# Patient Record
Sex: MALE | Race: WHITE | NOT HISPANIC OR LATINO | URBAN - METROPOLITAN AREA
[De-identification: names, ages, dates, MRNs, and addresses within clinical notes are randomized per-mention and may not be internally consistent; named-entity substitution may affect disease eponyms.]

---

## 2018-02-23 ENCOUNTER — IMPORTED ENCOUNTER (OUTPATIENT)
Dept: URBAN - METROPOLITAN AREA CLINIC 31 | Facility: CLINIC | Age: 58
End: 2018-02-23

## 2018-02-23 PROBLEM — H04.123: Noted: 2018-02-23

## 2018-02-23 PROCEDURE — 99213 OFFICE O/P EST LOW 20 MIN: CPT

## 2018-02-23 NOTE — PATIENT DISCUSSION
1.  Dry Eye OU:  Continue current management with Artificial Tears at least BID and can use Lotemax. 2.  Return for an appointment in 6 months for comprehensive exam. with Dr. Reji Jon.

## 2018-09-07 ENCOUNTER — IMPORTED ENCOUNTER (OUTPATIENT)
Dept: URBAN - METROPOLITAN AREA CLINIC 31 | Facility: CLINIC | Age: 58
End: 2018-09-07

## 2018-09-07 PROBLEM — H50.05: Noted: 2018-09-07

## 2018-09-07 PROBLEM — H04.123: Noted: 2018-09-07

## 2018-09-07 PROBLEM — E05.00: Noted: 2018-09-07

## 2018-09-07 PROBLEM — H53.2: Noted: 2018-09-07

## 2018-09-07 PROBLEM — H50.21: Noted: 2018-09-07

## 2018-09-07 PROCEDURE — 92014 COMPRE OPH EXAM EST PT 1/>: CPT

## 2018-09-07 PROCEDURE — 92060 SENSORIMOTOR EXAMINATION: CPT

## 2018-09-07 NOTE — PATIENT DISCUSSION
Graves Eye Disease - with secondary eso and right hyper deviation. Try Fresnel OS 3 SUNI. Recheck in 2 weeks to see if stable. May need some vertical prism too. If stable can ground into glasses.

## 2018-09-07 NOTE — PATIENT DISCUSSION
1.  Dry Eye OU:  Continue current management with Artificial Tears. 2.  Graves Eye Disease - with secondary eso and right hyper deviation. Try Fresnel OD 4 SUNI. Recheck in 2 weeks to see if stable. May need some vertical prism too. If stable can ground into glasses. 3. Return for an appointment in 2 weeks for office call. with Dr. Se Sweeney. 4.  Diplopia - Fresnel OD 4 SUNI.

## 2018-10-04 ENCOUNTER — IMPORTED ENCOUNTER (OUTPATIENT)
Dept: URBAN - METROPOLITAN AREA CLINIC 31 | Facility: CLINIC | Age: 58
End: 2018-10-04

## 2018-10-04 PROBLEM — H04.123: Noted: 2018-10-04

## 2018-10-04 PROBLEM — H53.2: Noted: 2018-10-04

## 2018-10-04 PROCEDURE — 99213 OFFICE O/P EST LOW 20 MIN: CPT

## 2018-10-04 PROCEDURE — 92060 SENSORIMOTOR EXAMINATION: CPT

## 2018-10-04 NOTE — PATIENT DISCUSSION
1.  Diplopia secondary to Grave's Disease- 3.5 SUNI prism OU with 1 BD OD and 1 BU OS. 2. Return for an appointment in 4 months for office call. with Dr. Janay Yi

## 2018-10-04 NOTE — PATIENT DISCUSSION
1.  Dry Eye OU:  Continue current management with Artificial Tears. 2.  Diplopia secondary to Grave's Disease- 3.5 SUNI prism OU with 1 BD OD and 1 BU OS. 3. Return for an appointment in 4 months for office call. with Dr. Bree Carvajal.

## 2019-02-01 ENCOUNTER — IMPORTED ENCOUNTER (OUTPATIENT)
Dept: URBAN - METROPOLITAN AREA CLINIC 31 | Facility: CLINIC | Age: 59
End: 2019-02-01

## 2019-02-01 PROBLEM — E05.00: Noted: 2019-02-01

## 2019-02-01 PROBLEM — H53.2: Noted: 2019-02-01

## 2019-02-01 PROBLEM — H04.123: Noted: 2019-02-01

## 2019-02-01 PROCEDURE — 99213 OFFICE O/P EST LOW 20 MIN: CPT

## 2019-02-01 NOTE — PATIENT DISCUSSION
Graves Eye Disease - Mild . No diplopia - has glasses with prisms. NO optic neuropathy glaucoma. Mild CAROLEE.

## 2019-02-01 NOTE — PATIENT DISCUSSION
1.  Dry Eye OU:  Continue current management with Artificial Tears. 2.  Diplopia secondary to Grave's Disease. Discussed disease and possible ophthalmic complications and importance of monitoring. 3.  Keep F/U for an appointment in 6 months for office call. with Dr. Nehemias Hassan.

## 2019-07-30 ENCOUNTER — IMPORTED ENCOUNTER (OUTPATIENT)
Dept: URBAN - METROPOLITAN AREA CLINIC 31 | Facility: CLINIC | Age: 59
End: 2019-07-30

## 2019-07-30 PROBLEM — E05.00: Noted: 2019-07-30

## 2019-07-30 PROBLEM — H40.051: Noted: 2019-07-30

## 2019-07-30 PROCEDURE — 99214 OFFICE O/P EST MOD 30 MIN: CPT

## 2019-07-30 NOTE — PATIENT DISCUSSION
1.  Ocular HTN OD:  Elevated intraocular pressure without signs of glaucomatous damage to the optic nerve. Start Travatan Z HS OD. Sample given. 2. Graves Eye Disease - Mild . No diplopia with glasses with prisms. Elevated IOP OD. DC Lotemax. Consult Dr. Beena Iniguez. 3.  Return for an appointment in 1 week for office call. with Dr. Padmini Gibbs.

## 2019-07-30 NOTE — PATIENT DISCUSSION
Graves Eye Disease - Mild . No diplopia with glasses with prisms. Elevated IOP OD. DC Lotemax. Consult Dr. Tana Cain.

## 2019-08-09 ENCOUNTER — IMPORTED ENCOUNTER (OUTPATIENT)
Dept: URBAN - METROPOLITAN AREA CLINIC 31 | Facility: CLINIC | Age: 59
End: 2019-08-09

## 2019-08-09 PROBLEM — E05.00: Noted: 2019-08-09

## 2019-08-09 PROBLEM — H40.051: Noted: 2019-08-09

## 2019-08-09 PROCEDURE — 99213 OFFICE O/P EST LOW 20 MIN: CPT

## 2019-08-09 NOTE — PATIENT DISCUSSION
1.  Ocular HTN OD:  Good response to Travatan Z with good IOP today. T max 32 mmHg. Continue Travatan Z HS OD. 2.  Graves Eye Disease - Mild . No diplopia with glasses with prisms. Hold on Lotemax. Keep appt with Dr. Elisabeth Montoya. 3.  Return for an appointment in 2 months for pressure check with Dr. Se Sweeney.

## 2021-01-06 ENCOUNTER — IMPORTED ENCOUNTER (OUTPATIENT)
Dept: URBAN - METROPOLITAN AREA CLINIC 31 | Facility: CLINIC | Age: 61
End: 2021-01-06

## 2021-01-06 PROBLEM — E05.00: Noted: 2021-01-06

## 2021-01-06 PROBLEM — H53.2: Noted: 2021-01-06

## 2021-01-06 PROBLEM — H40.051: Noted: 2021-01-06

## 2021-01-06 PROCEDURE — 92015 DETERMINE REFRACTIVE STATE: CPT

## 2021-01-06 PROCEDURE — 92060 SENSORIMOTOR EXAMINATION: CPT

## 2021-01-06 PROCEDURE — 92014 COMPRE OPH EXAM EST PT 1/>: CPT

## 2021-01-06 NOTE — PATIENT DISCUSSION
1.  Diplopia - significant improvement sp Tepezza treatment. Patient able to now fuse without prism. Remnant eso deviation and no remaining vertical deviation. 2.  Ocular HTN OD: Acceptable IOP today. T max 32 mmHg. Continue Travatan Z HS OD.  3.  Graves Eye Disease - Improvement in proptosis and EOM dysfunction. Follow up with Dr. Ramon Perez. 4.  Hold on getting new glasses. Do not use newer glasses which have prism. Use old glasses with no prism until next check. 5. Return for an appointment in 2 months for office call with Dr. Dyan Olea.

## 2021-01-06 NOTE — PATIENT DISCUSSION
1.  Ocular HTN OD:  Good response to Travatan Z with good IOP today. T max 32 mmHg. Continue Travatan Z HS OD. 2.  Graves Eye Disease - Improvement in proptosis and EOM dysfunction. Follow up with Dr. Elisabeth Montoya. 3.  Return for an appointment in 2 months for office call with Dr. Se Sweeney.

## 2021-04-16 ENCOUNTER — IMPORTED ENCOUNTER (OUTPATIENT)
Dept: URBAN - METROPOLITAN AREA CLINIC 31 | Facility: CLINIC | Age: 61
End: 2021-04-16

## 2021-04-16 PROBLEM — E05.00: Noted: 2021-04-16

## 2021-04-16 PROBLEM — H53.2: Noted: 2021-04-16

## 2021-04-16 PROBLEM — H40.051: Noted: 2021-04-16

## 2021-04-16 PROCEDURE — 92015 DETERMINE REFRACTIVE STATE: CPT

## 2021-04-16 PROCEDURE — 99214 OFFICE O/P EST MOD 30 MIN: CPT

## 2021-04-16 PROCEDURE — 92060 SENSORIMOTOR EXAMINATION: CPT

## 2021-04-16 NOTE — PATIENT DISCUSSION
1.  Diplopia - improvement sp Tepezza treatment but some persistent and variable deviation. Patient able to now fuse without prism but with difficulty. needs new rx with prism. 2. Ocular HTN OD: Acceptable IOP today. T max 32 mmHg. Continue Travatan Z HS OD.  3.  Graves Eye Disease - Follow up with Dr. Elisabeth Montoya. 4.  Refractive error - update glasses with 4 SUNI OU 1 BD OD and 1 BU OS. Understands may change. 5. Return for an appointment in 4 months for office call with Dr. Se Sweeney.

## 2021-09-17 ENCOUNTER — IMPORTED ENCOUNTER (OUTPATIENT)
Dept: URBAN - METROPOLITAN AREA CLINIC 31 | Facility: CLINIC | Age: 61
End: 2021-09-17

## 2021-09-17 NOTE — PATIENT DISCUSSION
1.  Diplopia - improvement sp Tepezza treatment but some persistent and variable deviation. Patient able to now fuse without prism but with difficulty. Update rx with adjusted prism. 2. Ocular HTN OD: Acceptable IOP today. T max 32 mmHg. Continue Travatan Z HS OD.  3.  Graves Eye Disease - Follow up with Dr. George Herrera. 4.  Refractive error - update glasses. Understands may change. 5. Return for an appointment in 4 months for office call with Dr. Parris Leone.

## 2021-09-21 PROBLEM — H53.2: Noted: 2021-09-21

## 2021-09-21 PROBLEM — H40.051: Noted: 2021-09-21

## 2021-09-21 PROBLEM — E05.00: Noted: 2021-09-21

## 2022-03-18 ENCOUNTER — IMPORTED ENCOUNTER (OUTPATIENT)
Dept: URBAN - METROPOLITAN AREA CLINIC 31 | Facility: CLINIC | Age: 62
End: 2022-03-18

## 2022-03-18 PROBLEM — E05.00: Noted: 2022-03-18

## 2022-03-18 PROBLEM — H40.051: Noted: 2022-03-18

## 2022-03-18 PROBLEM — H53.2: Noted: 2022-03-18

## 2022-03-18 PROCEDURE — 99214 OFFICE O/P EST MOD 30 MIN: CPT

## 2022-03-18 PROCEDURE — 92060 SENSORIMOTOR EXAMINATION: CPT

## 2022-03-18 PROCEDURE — 92015 DETERMINE REFRACTIVE STATE: CPT

## 2022-03-18 NOTE — PATIENT DISCUSSION
1.  Ocular HTN OD:  Good response to Travatan Z with good IOP today. T max 32 mmHg. Continue Travatan Z HS OD. 2.  Graves Disease with associated ALL - Increased vertical diplopia. Increase prism. Keep appt with Dr. Stanley Wakefield. 3.  Return for an appointment in 6 months for office call and pressure check with Dr. Argelia Schaeffer.

## 2022-03-18 NOTE — PATIENT DISCUSSION
1.  Diplopia - New rx with increase ion prism (2 1/2 OD and 3 OS) 2. Refractive error - change glasses. 3. Ocular HTN OD:  Good response to Travatan Z with good IOP today. T max 32 mmHg. Continue Travatan Z HS OD. 4.  Graves Disease with associated ALL - Increased vertical diplopia. Increase prism. Keep appt with Dr. Elisabeth Montoya. 5.  Return for an appointment in 6 months for office call and pressure check with Dr. Se Sweeney.

## 2022-04-01 ASSESSMENT — VISUAL ACUITY
OS_CC: J1
OU_CC: J1+
OS_SC: 20/20-1
OS_SC: 20/20
OD_CC: 20/40-3
OU_SC: J1+
OD_SC: 20/25-2
OD_SC: 20/40
OD_CC: J1+
OS_CC: 20/150
OS_SC: 20/30
OD_SC: 20/20
OD_SC: 20/70
OU_CC: 20/60
OD_SC: 20/20
OS_SC: 20/20
OD_SC: 20/20
OD_SC: 20/30
OS_SC: 20/40+2
OS_SC: 20/20
OS_SC: 20/40

## 2022-04-01 ASSESSMENT — TONOMETRY
OD_IOP_MMHG: 19
OD_IOP_MMHG: 17
OD_IOP_MMHG: 17
OS_IOP_MMHG: 18
OS_IOP_MMHG: 16
OD_IOP_MMHG: 30
OS_IOP_MMHG: 16
OD_IOP_MMHG: 20
OS_IOP_MMHG: 18
OD_IOP_MMHG: 18
OD_IOP_MMHG: 32
OD_IOP_MMHG: 17
OS_IOP_MMHG: 16
OS_IOP_MMHG: 17
OS_IOP_MMHG: 17
OD_IOP_MMHG: 20
OS_IOP_MMHG: 19

## 2022-06-01 ENCOUNTER — FOLLOW UP (OUTPATIENT)
Dept: URBAN - METROPOLITAN AREA CLINIC 29 | Facility: CLINIC | Age: 62
End: 2022-06-01

## 2022-06-01 DIAGNOSIS — H53.2: ICD-10-CM

## 2022-06-01 DIAGNOSIS — E05.00: ICD-10-CM

## 2022-06-01 DIAGNOSIS — H40.051: ICD-10-CM

## 2022-06-01 PROCEDURE — 99213 OFFICE O/P EST LOW 20 MIN: CPT

## 2022-06-01 PROCEDURE — 92060 SENSORIMOTOR EXAMINATION: CPT

## 2022-06-01 ASSESSMENT — VISUAL ACUITY
OS_CC: 20/20-1
OD_CC: 20/20-1
OS_CC: 20/20
OD_CC: 20/20

## 2022-06-01 ASSESSMENT — TONOMETRY
OS_IOP_MMHG: 18
OD_IOP_MMHG: 18

## 2022-06-01 NOTE — PATIENT DISCUSSION
Deviation increase has resolved and now back to previous deviation. Stay with 4.5 SUNI OU and 1.5 BD OD and 1.5 BU OS. Monitor.

## 2022-07-09 ENCOUNTER — TELEPHONE ENCOUNTER (OUTPATIENT)
Dept: URBAN - METROPOLITAN AREA CLINIC 121 | Facility: CLINIC | Age: 62
End: 2022-07-09

## 2022-07-09 RX ORDER — OMEPRAZOLE 20 MG/1
CAPSULE, DELAYED RELEASE ORAL
Refills: 0 | OUTPATIENT
Start: 2014-05-23 | End: 2014-05-23

## 2022-07-09 RX ORDER — ATENOLOL 25 MG/1
TABLET ORAL
Refills: 0 | OUTPATIENT
Start: 2014-05-23 | End: 2016-01-29

## 2022-07-10 ENCOUNTER — TELEPHONE ENCOUNTER (OUTPATIENT)
Dept: URBAN - METROPOLITAN AREA CLINIC 121 | Facility: CLINIC | Age: 62
End: 2022-07-10

## 2022-07-10 RX ORDER — ATENOLOL 25 MG/1
TABLET ORAL
Refills: 0 | Status: ACTIVE | COMMUNITY
Start: 2016-01-29

## 2022-07-10 RX ORDER — OMEPRAZOLE 20 MG/1
TABLET, DELAYED RELEASE ORAL
Refills: 0 | Status: ACTIVE | COMMUNITY
Start: 2016-01-29

## 2022-07-10 RX ORDER — POLYETHYLENE GLYCOL 3350, SODIUM SULFATE, SODIUM CHLORIDE, POTASSIUM CHLORIDE, ASCORBIC ACID, SODIUM ASCORBATE 7.5-2.691G
KIT ORAL TAKE AS DIRECTED
Refills: 0 | Status: ACTIVE | COMMUNITY
Start: 2014-05-23

## 2022-07-10 RX ORDER — ESOMEPRAZOLE MAGNESIUM 40 MG
CAPSULE,DELAYED RELEASE (ENTERIC COATED) ORAL ONCE A DAY
Refills: 3 | Status: ACTIVE | COMMUNITY
Start: 2008-12-19

## 2023-09-01 ENCOUNTER — LAB OUTSIDE AN ENCOUNTER (OUTPATIENT)
Dept: URBAN - METROPOLITAN AREA CLINIC 63 | Facility: CLINIC | Age: 63
End: 2023-09-01

## 2023-09-01 ENCOUNTER — WEB ENCOUNTER (OUTPATIENT)
Dept: URBAN - METROPOLITAN AREA CLINIC 63 | Facility: CLINIC | Age: 63
End: 2023-09-01

## 2023-09-01 ENCOUNTER — OFFICE VISIT (OUTPATIENT)
Dept: URBAN - METROPOLITAN AREA CLINIC 63 | Facility: CLINIC | Age: 63
End: 2023-09-01
Payer: COMMERCIAL

## 2023-09-01 VITALS
TEMPERATURE: 97.6 F | OXYGEN SATURATION: 97 % | HEIGHT: 70 IN | WEIGHT: 204.2 LBS | DIASTOLIC BLOOD PRESSURE: 80 MMHG | HEART RATE: 71 BPM | SYSTOLIC BLOOD PRESSURE: 126 MMHG | BODY MASS INDEX: 29.23 KG/M2

## 2023-09-01 DIAGNOSIS — R61 NIGHT SWEATS: ICD-10-CM

## 2023-09-01 DIAGNOSIS — R10.13 EPIGASTRIC PAIN: ICD-10-CM

## 2023-09-01 DIAGNOSIS — Z86.010 PERSONAL HISTORY OF COLONIC POLYPS: ICD-10-CM

## 2023-09-01 DIAGNOSIS — R11.0 NAUSEA: ICD-10-CM

## 2023-09-01 DIAGNOSIS — R74.8 ELEVATED LIVER ENZYMES: ICD-10-CM

## 2023-09-01 DIAGNOSIS — K21.9 GASTROESOPHAGEAL REFLUX DISEASE, UNSPECIFIED WHETHER ESOPHAGITIS PRESENT: ICD-10-CM

## 2023-09-01 DIAGNOSIS — K57.90 DIVERTICULOSIS: ICD-10-CM

## 2023-09-01 DIAGNOSIS — K76.0 FATTY LIVER: ICD-10-CM

## 2023-09-01 DIAGNOSIS — R63.4 UNINTENTIONAL WEIGHT LOSS: ICD-10-CM

## 2023-09-01 DIAGNOSIS — R14.0 BLOATING SYMPTOM: ICD-10-CM

## 2023-09-01 PROBLEM — 422587007: Status: ACTIVE | Noted: 2023-09-01

## 2023-09-01 PROBLEM — 197321007: Status: ACTIVE | Noted: 2023-09-01

## 2023-09-01 PROBLEM — 448765001: Status: ACTIVE | Noted: 2023-09-01

## 2023-09-01 PROBLEM — 42984000: Status: ACTIVE | Noted: 2023-09-01

## 2023-09-01 PROBLEM — 397881000: Status: ACTIVE | Noted: 2023-09-01

## 2023-09-01 PROBLEM — 248490000: Status: ACTIVE | Noted: 2023-09-01

## 2023-09-01 PROBLEM — 428283002: Status: ACTIVE | Noted: 2023-09-01

## 2023-09-01 PROBLEM — 235595009: Status: ACTIVE | Noted: 2023-09-01

## 2023-09-01 PROCEDURE — 99204 OFFICE O/P NEW MOD 45 MIN: CPT | Performed by: PHYSICIAN ASSISTANT

## 2023-09-01 RX ORDER — LEVOTHYROXINE SODIUM 88 UG/1
TABLET ORAL
Qty: 90 TABLET | Status: ACTIVE | COMMUNITY

## 2023-09-01 RX ORDER — OMEPRAZOLE 20 MG/1
CAPSULE, DELAYED RELEASE ORAL
Qty: 90 CAPSULE | Status: ACTIVE | COMMUNITY

## 2023-09-01 RX ORDER — ATENOLOL 25 MG/1
TAKE 1 TABLET BY MOUTH EVERY DAY TABLET ORAL
Qty: 90 EACH | Refills: 1 | Status: ACTIVE | COMMUNITY

## 2023-09-01 RX ORDER — SODIUM, POTASSIUM,MAG SULFATES 17.5-3.13G
177ML SOLUTION, RECONSTITUTED, ORAL ORAL
Qty: 1 KIT | Refills: 0 | OUTPATIENT
Start: 2023-09-01 | End: 2023-09-02

## 2023-09-01 RX ORDER — GABAPENTIN 300 MG/1
TAKE 1 CAPSULE BY MOUTH THREE TIMES A DAY AS DIRECTED FOR 30 DAYS CAPSULE ORAL
Qty: 90 EACH | Refills: 0 | Status: ACTIVE | COMMUNITY

## 2023-09-01 RX ORDER — EZETIMIBE 10 MG/1
TAKE 1 TABLET BY MOUTH EVERY DAY TABLET ORAL
Qty: 90 EACH | Refills: 1 | Status: DISCONTINUED | COMMUNITY

## 2023-09-01 RX ORDER — METFORMIN HYDROCHLORIDE 500 MG/1
TAKE 2 TABLETS BY MOUTH IN THE MORNING AND 2 TABLETS WITH DINNER DAILY TABLET, FILM COATED ORAL
Qty: 360 EACH | Refills: 1 | Status: ACTIVE | COMMUNITY

## 2023-09-01 RX ORDER — OMEPRAZOLE 20 MG/1
1 CAPSULE 30 MINUTES BEFORE MORNING MEAL AND EVENING MEAL CAPSULE, DELAYED RELEASE ORAL TWICE A DAY
Qty: 180 | Refills: 0

## 2023-09-01 RX ORDER — ONDANSETRON HYDROCHLORIDE 4 MG/1
1 TABLET TABLET, FILM COATED ORAL
Qty: 30 | Refills: 0 | OUTPATIENT
Start: 2023-09-01

## 2023-09-01 RX ORDER — HYDROXYZINE HYDROCHLORIDE 25 MG/1
1 TABLET AS NEEDED TABLET, FILM COATED ORAL ONCE A DAY
Status: ACTIVE | COMMUNITY

## 2023-09-01 RX ORDER — EMPAGLIFLOZIN 25 MG/1
TAKE 1 TABLET BY MOUTH EVERY DAY TABLET, FILM COATED ORAL
Qty: 90 EACH | Refills: 0 | Status: ACTIVE | COMMUNITY

## 2023-09-01 RX ORDER — METHOCARBAMOL 500 MG/1
TABLET, FILM COATED ORAL
Qty: 240 TABLET | Status: ACTIVE | COMMUNITY

## 2023-09-01 RX ORDER — HYDROCODONE BITARTRATE AND ACETAMINOPHEN 5; 325 MG/1; MG/1
1 TABLET AS NEEDED TABLET ORAL
Status: ACTIVE | COMMUNITY

## 2023-09-01 NOTE — HPI-TODAY'S VISIT:
Patient is a 63 year old male with complaints of  increased bloating, nausea, distention , night sweats, which started a few months ago. In the meantime he is also having worsening GERD symptoms not controlled anymore by omeprazole. He has eliminated his trigger foods and feels that his GERD symptoms are not related to diet. He c/o epigastric pain that wakes him from sleep with associated nausea w/o vomiting. He c/o lower abdominal pain for 3-4 months. Had CT abdomen which was normal without change from a year ago.  CT showed Fatty liver. Lost 15 pounds unintentionally in the past 6 weeks. He has reduced appetite. He c/o  Early satiety. Bms are normal, 2-3 per day , stool is formed, no diarrhea or constipation. Having bright red blood when he wipes, couple times a week. Having rectal pain after 2nd or 3rd bowel movement.  Recent labs 8/4/2023 at [facility]  glucose 87 Creatinine 0.76 Sodium 139 Potassium 4.3   TSH 6.83 Alkaline phosphatase 98 Bilirubin 0.5 CT abdomen and pelvis with and without contrast 8/11/2023 No ileus or enterocolitis is shown. Occasional diverticuli are present. Pancreatic 6 mm lesion is stable over multiple years on multiple exams consistent with IPMN. Fatty change in the liver is redemonstrated.  Low-density liver parenchyma may reflect hepatocellular disease such as steatosis. Impression stable subcentimeter nodule along the right major lung fissure, probably a benign intrafissural lymph node.  No suspicious pulmonary finding.  Scattered mild scarring and again noted. Abdominal ultrasound 7/29/2020 Fatty infiltration of the liver  COLON 7/24/14 IH, no polyps.  EGD w/ dilation 10/9/2008.

## 2023-09-07 ENCOUNTER — COMPREHENSIVE EXAM (OUTPATIENT)
Dept: URBAN - METROPOLITAN AREA CLINIC 29 | Facility: CLINIC | Age: 63
End: 2023-09-07

## 2023-09-07 DIAGNOSIS — E11.9: ICD-10-CM

## 2023-09-07 DIAGNOSIS — E05.00: ICD-10-CM

## 2023-09-07 DIAGNOSIS — H53.2: ICD-10-CM

## 2023-09-07 DIAGNOSIS — H40.051: ICD-10-CM

## 2023-09-07 PROCEDURE — 99214 OFFICE O/P EST MOD 30 MIN: CPT

## 2023-09-07 PROCEDURE — 92015 DETERMINE REFRACTIVE STATE: CPT

## 2023-09-07 ASSESSMENT — VISUAL ACUITY
OD_PH: 20/25
OD_CC: 20/20-1
OD_CC: 20/40
OS_CC: 20/25-1
OS_CC: 20/20-1

## 2023-09-07 ASSESSMENT — TONOMETRY
OD_IOP_MMHG: 18
OS_IOP_MMHG: 16

## 2023-09-08 ENCOUNTER — LAB OUTSIDE AN ENCOUNTER (OUTPATIENT)
Dept: URBAN - METROPOLITAN AREA CLINIC 63 | Facility: CLINIC | Age: 63
End: 2023-09-08

## 2023-09-08 PROBLEM — 707724006: Status: ACTIVE | Noted: 2023-09-08

## 2023-09-19 ENCOUNTER — TELEPHONE ENCOUNTER (OUTPATIENT)
Dept: URBAN - METROPOLITAN AREA CLINIC 63 | Facility: CLINIC | Age: 63
End: 2023-09-19

## 2023-09-21 ENCOUNTER — WEB ENCOUNTER (OUTPATIENT)
Dept: URBAN - METROPOLITAN AREA CLINIC 63 | Facility: CLINIC | Age: 63
End: 2023-09-21

## 2023-09-21 LAB
ABSOLUTE BASOPHILS: 66
ABSOLUTE EOSINOPHILS: 432
ABSOLUTE LYMPHOCYTES: 4690
ABSOLUTE MONOCYTES: 1323
ABSOLUTE NEUTROPHILS: 6589
BASOPHILS: 0.5
EOSINOPHILS: 3.3
HEMATOCRIT: 46.9
HEMOGLOBIN: 15.3
LYMPHOCYTES: 35.8
MCH: 29.6
MCHC: 32.6
MCV: 90.7
MONOCYTES: 10.1
MPV: 10.8
NEUTROPHILS: 50.3
PLATELET COUNT: 316
RDW: 16.2
RED BLOOD CELL COUNT: 5.17
WHITE BLOOD CELL COUNT: 13.1

## 2023-09-23 LAB
IMMUNOGLOBULIN A: 162
IMMUNOGLOBULIN G: 1051
IMMUNOGLOBULIN M: 169

## 2023-10-04 LAB
ACTIN (SMOOTH MUSCLE) ANTIBODY (IGG): <20
AFP, SERUM, TUMOR MARKER: 5.2
ALPHA-1-ANTITRYPSIN (AAT) PHENOTYPE: (no result)
ANA SCREEN, IFA: NEGATIVE
FERRITIN, SERUM: 31
HCV RNA, QUANTITATIVE REAL TIME PCR: <1.18
HCV RNA, QUANTITATIVE REAL TIME PCR: <15
HEP A AB, IGM: (no result)
HEP B CORE AB, IGM: (no result)
HEPATITIS A AB, TOTAL: (no result)
HEPATITIS B CORE AB TOTAL: (no result)
HEPATITIS B SURFACE AB IMMUNITY, QN: <5
HEPATITIS B SURFACE ANTIGEN: (no result)
HEPATITIS C ANTIBODY: (no result)
HEREDITARY HEMOCHROMATOSIS DNA MUT: (no result)
INR: 1.1
IRON BIND.CAP.(TIBC): 517
IRON SATURATION: 17
IRON: 88
MITOCHONDRIAL (M2) ANTIBODY: <=20
PT: 11

## 2023-10-10 ENCOUNTER — OFFICE VISIT (OUTPATIENT)
Dept: URBAN - METROPOLITAN AREA SURGERY CENTER 4 | Facility: SURGERY CENTER | Age: 63
End: 2023-10-10
Payer: COMMERCIAL

## 2023-10-10 ENCOUNTER — CLAIMS CREATED FROM THE CLAIM WINDOW (OUTPATIENT)
Dept: URBAN - METROPOLITAN AREA CLINIC 4 | Facility: CLINIC | Age: 63
End: 2023-10-10
Payer: COMMERCIAL

## 2023-10-10 DIAGNOSIS — K44.9 DIAPHRAGMATIC HERNIA WITHOUT OBSTRUCTION OR GANGRENE: ICD-10-CM

## 2023-10-10 DIAGNOSIS — K64.0 FIRST DEGREE HEMORRHOIDS: ICD-10-CM

## 2023-10-10 DIAGNOSIS — K29.70 GASTRITIS WITHOUT BLEEDING, UNSPECIFIED CHRONICITY, UNSPECIFIED GASTRITIS TYPE: ICD-10-CM

## 2023-10-10 DIAGNOSIS — Z12.11 ENCOUNTER FOR SCREENING FOR MALIGNANT NEOPLASM OF COLON: ICD-10-CM

## 2023-10-10 DIAGNOSIS — Z87.19 PERSONAL HISTORY OF OTHER DISEASES OF THE DIGESTIVE SYSTEM: ICD-10-CM

## 2023-10-10 DIAGNOSIS — K22.2 ESOPHAGEAL OBSTRUCTION: ICD-10-CM

## 2023-10-10 DIAGNOSIS — K63.5 POLYP OF ASCENDING COLON, UNSPECIFIED TYPE: ICD-10-CM

## 2023-10-10 DIAGNOSIS — K31.89 OTHER DISEASES OF STOMACH AND DUODENUM: ICD-10-CM

## 2023-10-10 DIAGNOSIS — K63.5 BENIGN COLONIC POLYP: ICD-10-CM

## 2023-10-10 DIAGNOSIS — R12 HEARTBURN: ICD-10-CM

## 2023-10-10 DIAGNOSIS — Z86.010 ADENOMAS PERSONAL HISTORY OF COLONIC POLYPS: ICD-10-CM

## 2023-10-10 DIAGNOSIS — K44.9 HIATAL HERNIA: ICD-10-CM

## 2023-10-10 PROCEDURE — 00813 ANES UPR LWR GI NDSC PX: CPT | Performed by: NURSE ANESTHETIST, CERTIFIED REGISTERED

## 2023-10-10 PROCEDURE — 88305 TISSUE EXAM BY PATHOLOGIST: CPT | Performed by: PATHOLOGY

## 2023-10-10 PROCEDURE — 45385 COLONOSCOPY W/LESION REMOVAL: CPT | Performed by: INTERNAL MEDICINE

## 2023-10-10 PROCEDURE — 88312 SPECIAL STAINS GROUP 1: CPT | Performed by: PATHOLOGY

## 2023-10-10 PROCEDURE — 43239 EGD BIOPSY SINGLE/MULTIPLE: CPT | Performed by: INTERNAL MEDICINE

## 2023-10-10 RX ORDER — OMEPRAZOLE 20 MG/1
1 CAPSULE 30 MINUTES BEFORE MORNING MEAL AND EVENING MEAL CAPSULE, DELAYED RELEASE ORAL TWICE A DAY
Qty: 180 | Refills: 0 | Status: ACTIVE | COMMUNITY

## 2023-10-10 RX ORDER — ONDANSETRON HYDROCHLORIDE 4 MG/1
1 TABLET TABLET, FILM COATED ORAL
Qty: 30 | Refills: 0 | Status: ACTIVE | COMMUNITY
Start: 2023-09-01

## 2023-10-10 RX ORDER — METHOCARBAMOL 500 MG/1
TABLET, FILM COATED ORAL
Qty: 240 TABLET | Status: ACTIVE | COMMUNITY

## 2023-10-10 RX ORDER — GABAPENTIN 300 MG/1
TAKE 1 CAPSULE BY MOUTH THREE TIMES A DAY AS DIRECTED FOR 30 DAYS CAPSULE ORAL
Qty: 90 EACH | Refills: 0 | Status: ACTIVE | COMMUNITY

## 2023-10-10 RX ORDER — HYDROXYZINE HYDROCHLORIDE 25 MG/1
1 TABLET AS NEEDED TABLET, FILM COATED ORAL ONCE A DAY
Status: ACTIVE | COMMUNITY

## 2023-10-10 RX ORDER — EMPAGLIFLOZIN 25 MG/1
TAKE 1 TABLET BY MOUTH EVERY DAY TABLET, FILM COATED ORAL
Qty: 90 EACH | Refills: 0 | Status: ACTIVE | COMMUNITY

## 2023-10-10 RX ORDER — METFORMIN HYDROCHLORIDE 500 MG/1
TAKE 2 TABLETS BY MOUTH IN THE MORNING AND 2 TABLETS WITH DINNER DAILY TABLET, FILM COATED ORAL
Qty: 360 EACH | Refills: 1 | Status: ACTIVE | COMMUNITY

## 2023-10-10 RX ORDER — LEVOTHYROXINE SODIUM 88 UG/1
TABLET ORAL
Qty: 90 TABLET | Status: ACTIVE | COMMUNITY

## 2023-10-10 RX ORDER — ATENOLOL 25 MG/1
TAKE 1 TABLET BY MOUTH EVERY DAY TABLET ORAL
Qty: 90 EACH | Refills: 1 | Status: ACTIVE | COMMUNITY

## 2023-10-10 RX ORDER — HYDROCODONE BITARTRATE AND ACETAMINOPHEN 5; 325 MG/1; MG/1
1 TABLET AS NEEDED TABLET ORAL
Status: ACTIVE | COMMUNITY

## 2023-10-11 ENCOUNTER — TELEPHONE ENCOUNTER (OUTPATIENT)
Dept: URBAN - METROPOLITAN AREA CLINIC 64 | Facility: CLINIC | Age: 63
End: 2023-10-11

## 2023-11-02 ENCOUNTER — OFFICE VISIT (OUTPATIENT)
Dept: URBAN - METROPOLITAN AREA CLINIC 63 | Facility: CLINIC | Age: 63
End: 2023-11-02

## 2023-11-10 ENCOUNTER — OFFICE VISIT (OUTPATIENT)
Dept: URBAN - METROPOLITAN AREA CLINIC 57 | Facility: CLINIC | Age: 63
End: 2023-11-10
Payer: COMMERCIAL

## 2023-11-10 ENCOUNTER — LAB OUTSIDE AN ENCOUNTER (OUTPATIENT)
Dept: URBAN - METROPOLITAN AREA CLINIC 57 | Facility: CLINIC | Age: 63
End: 2023-11-10

## 2023-11-10 VITALS
HEIGHT: 70 IN | WEIGHT: 206 LBS | BODY MASS INDEX: 29.49 KG/M2 | OXYGEN SATURATION: 98 % | DIASTOLIC BLOOD PRESSURE: 80 MMHG | SYSTOLIC BLOOD PRESSURE: 124 MMHG | HEART RATE: 78 BPM

## 2023-11-10 DIAGNOSIS — K21.9 GASTROESOPHAGEAL REFLUX DISEASE, UNSPECIFIED WHETHER ESOPHAGITIS PRESENT: ICD-10-CM

## 2023-11-10 DIAGNOSIS — Z86.010 HISTORY OF COLON POLYPS: ICD-10-CM

## 2023-11-10 DIAGNOSIS — K76.0 FATTY LIVER: ICD-10-CM

## 2023-11-10 DIAGNOSIS — R74.8 ELEVATED LIVER ENZYMES: ICD-10-CM

## 2023-11-10 PROBLEM — 19943007: Status: ACTIVE | Noted: 2023-11-10

## 2023-11-10 PROBLEM — 235623002: Status: ACTIVE | Noted: 2023-11-10

## 2023-11-10 PROBLEM — 8493009: Status: ACTIVE | Noted: 2023-11-10

## 2023-11-10 PROCEDURE — 99214 OFFICE O/P EST MOD 30 MIN: CPT

## 2023-11-10 RX ORDER — OMEPRAZOLE 20 MG/1
1 CAPSULE 30 MINUTES BEFORE MORNING MEAL AND EVENING MEAL CAPSULE, DELAYED RELEASE ORAL TWICE A DAY
Qty: 180 | Refills: 0 | Status: ACTIVE | COMMUNITY

## 2023-11-10 RX ORDER — METFORMIN HYDROCHLORIDE 500 MG/1
TAKE 2 TABLETS BY MOUTH IN THE MORNING AND 2 TABLETS WITH DINNER DAILY TABLET, FILM COATED ORAL
Qty: 360 EACH | Refills: 1 | Status: ACTIVE | COMMUNITY

## 2023-11-10 RX ORDER — HYDROCODONE BITARTRATE AND ACETAMINOPHEN 5; 325 MG/1; MG/1
1 TABLET AS NEEDED TABLET ORAL
Status: ACTIVE | COMMUNITY

## 2023-11-10 RX ORDER — GABAPENTIN 300 MG/1
TAKE 1 CAPSULE BY MOUTH THREE TIMES A DAY AS DIRECTED FOR 30 DAYS CAPSULE ORAL
Qty: 90 EACH | Refills: 0 | Status: ACTIVE | COMMUNITY

## 2023-11-10 RX ORDER — METHOCARBAMOL 500 MG/1
TABLET, FILM COATED ORAL
Qty: 240 TABLET | Status: ACTIVE | COMMUNITY

## 2023-11-10 RX ORDER — HYDROXYZINE HYDROCHLORIDE 25 MG/1
1 TABLET AS NEEDED TABLET, FILM COATED ORAL ONCE A DAY
Status: ACTIVE | COMMUNITY

## 2023-11-10 RX ORDER — EMPAGLIFLOZIN 25 MG/1
TAKE 1 TABLET BY MOUTH EVERY DAY TABLET, FILM COATED ORAL
Qty: 90 EACH | Refills: 0 | Status: ACTIVE | COMMUNITY

## 2023-11-10 RX ORDER — LEVOTHYROXINE SODIUM 88 UG/1
TABLET ORAL
Qty: 90 TABLET | Status: ACTIVE | COMMUNITY

## 2023-11-10 RX ORDER — ATENOLOL 25 MG/1
TAKE 1 TABLET BY MOUTH EVERY DAY TABLET ORAL
Qty: 90 EACH | Refills: 1 | Status: ACTIVE | COMMUNITY

## 2023-11-10 NOTE — HPI-PREVIOUS PROCEDURES
EGD 10/10/2023: - Normal esophagus.- 1 cm hiatal hernia.- Chronic gastritis.  Biopsied.- Normal duodenal bulb and second portion of the duodenum.- Non-obstructing Schatzki ring.  Biopsied.- Biopsies were taken with a cold forceps for evaluation of eosinophilic esophagitis . Colonoscopy 10/10/2023: - Non-bleeding internal hemorrhoids.- One 6 mm polyp in the ascending colon, removed with a cold snare.  Resected and retrieved - Follow up for colonoscopy in 5 years . Pathology returning as follows: (A) Stomach, Antrum and Body, Biopsy:FOVEOLAR HYPERPLASIA. See Comment.No Evidence of H. Pylori Organisms or Intestinal Metaplasia.Negative for Dysplasia or Malignancy.COMMENT: Foveolar hyperplasia is common in gastric mucosa adjacent to ulcer or as part of chemicalgastropathy (such as NSAID, Alcohol and Bile reflex), and rare in the gastric mucosa overlying a masslesion. . (B) Gastroesophageal Junction, Biopsy:SQUAMOCOLUMNAR MUCOSA WITH REFLUX-TYPE CHANGES.No Evidence of Deluna's Esophagus or Eosinophilic Esophagitis.Negative for Infectious organisms, Dysplasia or Malignancy. . (C) Esophagus, Upper-Third, Biopsy:SQUAMOUS MUCOSA WITH REFLUX-TYPE CHANGES; NO COLUMNAR MUCOSA             IDENTIFIED.No Evidence of Deluna's Esophagus or Eosinophilic Esophagitis.Negative for Infectious organisms, Dysplasia or Malignancy. . (D) Colon, Ascending, Polypectomy:TUBULAR ADENOMA

## 2023-11-10 NOTE — HPI-PREVIOUS LABS
Dscussed 9/19/2023 labs in detail. Including investigation of cause for fatty liver (labs showing no abnormalities suggesting cause of fatty liver)

## 2023-11-10 NOTE — HPI-TODAY'S VISIT:
Patrick is a 63-year-old male presenting to the office today for follow-up after EGD and colonoscopy. Results are discussed in detail below. He is also here to discuss finding of cirrhosis on FibroScan from earlier this year. Patrick had elevated liver enzymes earlier this year which was worked up with FibroScan and serology. Serology came back negative for potential causes of elevated liver enzymes, FibroScan came back showing severe steatosis and fibrosis/cirrhosis of the liver. He also had a CT scan that showed no liver lesions but did show IPMN of pancreas. He has had multiple images completed of abdomen/pancreas since 2018 showing pancreatic mass, most recently classified as IPMN he has not had more specific imaging than a CT abdomen/pelvis. He is complaining of bloating/fullness of his stomach for the past month or so. He describes it as feeling as if it is full of fluid/he is bloated. He feels that in his lower abdomen mostly but states when he lays down sometimes it goes towards his upper abdomen. He is not experiencing any other symptoms and has no other questions, concerns, complaints. He denies overuse of NSAIDs, dysphagia, odynophagia, heartburn/reflux, epigastric/RUQ pain, constipation/diarrhea/changes in bowel habits, change in stool caliber, hematochezia, melena, blood when wiping. He also denies jaundice, pruritus, asterixis, confusion, acholic stools, dark urine, lower extremity edema.

## 2023-11-17 ENCOUNTER — LAB OUTSIDE AN ENCOUNTER (OUTPATIENT)
Dept: URBAN - METROPOLITAN AREA CLINIC 57 | Facility: CLINIC | Age: 63
End: 2023-11-17

## 2023-11-17 ENCOUNTER — TELEPHONE ENCOUNTER (OUTPATIENT)
Dept: URBAN - METROPOLITAN AREA CLINIC 63 | Facility: CLINIC | Age: 63
End: 2023-11-17

## 2023-12-08 ENCOUNTER — TELEPHONE ENCOUNTER (OUTPATIENT)
Dept: URBAN - METROPOLITAN AREA CLINIC 64 | Facility: CLINIC | Age: 63
End: 2023-12-08

## 2024-01-03 ENCOUNTER — OFFICE VISIT (OUTPATIENT)
Dept: URBAN - METROPOLITAN AREA CLINIC 63 | Facility: CLINIC | Age: 64
End: 2024-01-03
Payer: COMMERCIAL

## 2024-01-03 ENCOUNTER — DASHBOARD ENCOUNTERS (OUTPATIENT)
Age: 64
End: 2024-01-03

## 2024-01-03 VITALS
HEIGHT: 70 IN | TEMPERATURE: 96.2 F | HEART RATE: 75 BPM | OXYGEN SATURATION: 96 % | SYSTOLIC BLOOD PRESSURE: 125 MMHG | RESPIRATION RATE: 20 BRPM | DIASTOLIC BLOOD PRESSURE: 70 MMHG | BODY MASS INDEX: 30.84 KG/M2 | WEIGHT: 215.4 LBS

## 2024-01-03 DIAGNOSIS — K76.0 FATTY LIVER: ICD-10-CM

## 2024-01-03 DIAGNOSIS — Z86.010 HISTORY OF COLON POLYPS: ICD-10-CM

## 2024-01-03 DIAGNOSIS — K29.50 CHRONIC GASTRITIS WITHOUT BLEEDING, UNSPECIFIED GASTRITIS TYPE: ICD-10-CM

## 2024-01-03 DIAGNOSIS — K21.9 GASTROESOPHAGEAL REFLUX DISEASE, UNSPECIFIED WHETHER ESOPHAGITIS PRESENT: ICD-10-CM

## 2024-01-03 PROCEDURE — 99214 OFFICE O/P EST MOD 30 MIN: CPT

## 2024-01-03 RX ORDER — OMEPRAZOLE 20 MG/1
1 CAPSULE 30 MINUTES BEFORE MORNING MEAL AND EVENING MEAL CAPSULE, DELAYED RELEASE ORAL TWICE A DAY
Qty: 180 | Refills: 0 | Status: ACTIVE | COMMUNITY

## 2024-01-03 RX ORDER — METFORMIN HYDROCHLORIDE 500 MG/1
TAKE 2 TABLETS BY MOUTH IN THE MORNING AND 2 TABLETS WITH DINNER DAILY TABLET, FILM COATED ORAL
Qty: 360 EACH | Refills: 1 | Status: ACTIVE | COMMUNITY

## 2024-01-03 RX ORDER — LEVOTHYROXINE SODIUM 88 UG/1
TABLET ORAL
Qty: 90 TABLET | Status: ACTIVE | COMMUNITY

## 2024-01-03 RX ORDER — HYDROXYZINE HYDROCHLORIDE 25 MG/1
1 TABLET AS NEEDED TABLET, FILM COATED ORAL ONCE A DAY
Status: ACTIVE | COMMUNITY

## 2024-01-03 RX ORDER — GABAPENTIN 300 MG/1
TAKE 1 CAPSULE BY MOUTH THREE TIMES A DAY AS DIRECTED FOR 30 DAYS CAPSULE ORAL
Qty: 90 EACH | Refills: 0 | Status: ACTIVE | COMMUNITY

## 2024-01-03 RX ORDER — ATENOLOL 25 MG/1
TAKE 1 TABLET BY MOUTH EVERY DAY TABLET ORAL
Qty: 90 EACH | Refills: 1 | Status: ACTIVE | COMMUNITY

## 2024-01-03 RX ORDER — METHOCARBAMOL 500 MG/1
TABLET, FILM COATED ORAL
Qty: 240 TABLET | Status: ACTIVE | COMMUNITY

## 2024-01-03 RX ORDER — EMPAGLIFLOZIN 25 MG/1
TAKE 1 TABLET BY MOUTH EVERY DAY TABLET, FILM COATED ORAL
Qty: 90 EACH | Refills: 0 | Status: ACTIVE | COMMUNITY

## 2024-01-03 RX ORDER — HYDROCODONE BITARTRATE AND ACETAMINOPHEN 5; 325 MG/1; MG/1
1 TABLET AS NEEDED TABLET ORAL
Status: ACTIVE | COMMUNITY

## 2024-01-03 NOTE — HPI-PREVIOUS IMAGING
11/27/2023 MRCP with no evidence of pancreatic IPMN  11/27/2023 elastography 1. The study is diagnostic 2. Liver stiffness is 6.7 kPa. Please see the below SRU consensus statement 3. METAVIR stage:F2  11/27/2023 abdominal ultrasound with no ascites or pleural effusions and no acute abnormalities.  9/6/2023 FibroScan significant for advanced fibrosis or cirrhosis with severe hepatic steatosis  8/11/2023 CT abdomen pelvis with and without contrast significant for no ileus or enterocolitis Pancreatic 6 mm lesion is stable over multiple years on multiple exams consistent with IPMN Fatty change in liver is redemonstrated

## 2024-01-03 NOTE — HPI-ZZZTODAY'S VISIT
Patrick is a very pleasant 63-year-old male who presents in 2-month follow-up of abdominal pain.  He is a patient of Dr. Osullivan's last seen 11/10/2023.  Past medical history significant for diabetes, hypothyroidism, hepatic steatosis, colon polyps, gastritis, IPMN, and, Schatzki's ring, GERD, diverticulosis. Past surgical history significant for vasectomy. He denies a family history of colon polyps or GI malignancy.  At the previous visit the following was relayed: Patrick is a 63-year-old male presenting to the office today for follow-up after EGD and colonoscopy. Results are discussed in detail below. He is also here to discuss finding of cirrhosis on FibroScan from earlier this year. Patrick had elevated liver enzymes earlier this year which was worked up with FibroScan and serology. Serology came back negative for potential causes of elevated liver enzymes, FibroScan came back showing severe steatosis and fibrosis/cirrhosis of the liver. He also had a CT scan that showed no liver lesions but did show IPMN of pancreas. He has had multiple images completed of abdomen/pancreas since 2018 showing pancreatic mass, most recently classified as IPMN he has not had more specific imaging than a CT abdomen/pelvis. He is complaining of bloating/fullness of his stomach for the past month or so. He describes it as feeling as if it is full of fluid/he is bloated. He feels that in his lower abdomen mostly but states when he lays down sometimes it goes towards his upper abdomen. He is not experiencing any other symptoms and has no other questions, concerns, complaints. He denies overuse of NSAIDs, dysphagia, odynophagia, heartburn/reflux, epigastric/RUQ pain, constipation/diarrhea/changes in bowel habits, change in stool caliber, hematochezia, melena, blood when wiping. He also denies jaundice, pruritus, asterixis, confusion, acholic stools, dark urine, lower extremity edema.  For the IPMN in an MRCP was ordered and patient was to see oncologist per patient's PCP For his liver disease and ultrasound was done to assess for ascites, if present would consider paracentesis versus diuretics and an MRI would be done of the liver for further investigation In regard to his history of GERD he will continue omeprazole  Patrick presents with his wife today in follow up of previously mentioned work up. He states he continues to wake up feeling nausea and dry heaving that passes on its own. He conitnues to have night sweats and abdominal pain as previously mentioned. He relates he has seen an oncologist tomorrow and is being evaluated for CLL. He will have the reports from this visit sent to our office. He denies overuse of NSAIDs, dysphagia, odynophagia, heartburn/reflux, epigastric/RUQ pain, constipation/diarrhea/changes in bowel habits, change in stool caliber, hematochezia, melena, blood when wiping. He also denies jaundice, pruritus, asterixis, confusion, dark urine, lower extremity edema. Of note, he did not have liver MRI done because the order did not go to [place] where all of the other imaging was sent to.

## 2024-01-10 ENCOUNTER — OFFICE VISIT (OUTPATIENT)
Dept: URBAN - METROPOLITAN AREA CLINIC 63 | Facility: CLINIC | Age: 64
End: 2024-01-10

## 2024-01-17 ENCOUNTER — OFFICE VISIT (OUTPATIENT)
Dept: URBAN - METROPOLITAN AREA CLINIC 57 | Facility: CLINIC | Age: 64
End: 2024-01-17

## 2024-03-01 ENCOUNTER — LAB (OUTPATIENT)
Dept: URBAN - METROPOLITAN AREA CLINIC 57 | Facility: CLINIC | Age: 64
End: 2024-03-01

## 2024-07-03 ENCOUNTER — LAB OUTSIDE AN ENCOUNTER (OUTPATIENT)
Dept: URBAN - METROPOLITAN AREA CLINIC 63 | Facility: CLINIC | Age: 64
End: 2024-07-03